# Patient Record
Sex: MALE | Race: WHITE
[De-identification: names, ages, dates, MRNs, and addresses within clinical notes are randomized per-mention and may not be internally consistent; named-entity substitution may affect disease eponyms.]

---

## 2019-01-15 ENCOUNTER — HOSPITAL ENCOUNTER (INPATIENT)
Dept: HOSPITAL 54 - ER | Age: 54
LOS: 1 days | Discharge: HOME | DRG: 190 | End: 2019-01-16
Attending: INTERNAL MEDICINE | Admitting: NURSE PRACTITIONER
Payer: COMMERCIAL

## 2019-01-15 VITALS — BODY MASS INDEX: 26.66 KG/M2 | WEIGHT: 180 LBS | HEIGHT: 69 IN

## 2019-01-15 DIAGNOSIS — F17.210: ICD-10-CM

## 2019-01-15 DIAGNOSIS — I21.4: Primary | ICD-10-CM

## 2019-01-15 DIAGNOSIS — E11.9: ICD-10-CM

## 2019-01-15 DIAGNOSIS — K76.0: ICD-10-CM

## 2019-01-15 DIAGNOSIS — N28.1: ICD-10-CM

## 2019-01-15 DIAGNOSIS — Z79.84: ICD-10-CM

## 2019-01-15 DIAGNOSIS — Z71.6: ICD-10-CM

## 2019-01-15 DIAGNOSIS — N17.0: ICD-10-CM

## 2019-01-15 DIAGNOSIS — I10: ICD-10-CM

## 2019-01-15 DIAGNOSIS — K40.20: ICD-10-CM

## 2019-01-15 DIAGNOSIS — Z87.442: ICD-10-CM

## 2019-01-15 LAB
ALBUMIN SERPL BCP-MCNC: 3.9 G/DL (ref 3.4–5)
ALP SERPL-CCNC: 48 U/L (ref 46–116)
ALT SERPL W P-5'-P-CCNC: 41 U/L (ref 12–78)
AST SERPL W P-5'-P-CCNC: 14 U/L (ref 15–37)
BASOPHILS # BLD AUTO: 0 /CMM (ref 0–0.2)
BASOPHILS NFR BLD AUTO: 0.4 % (ref 0–2)
BILIRUB DIRECT SERPL-MCNC: 0 MG/DL (ref 0–0.2)
BILIRUB SERPL-MCNC: 0.2 MG/DL (ref 0.2–1)
BUN SERPL-MCNC: 21 MG/DL (ref 7–18)
CALCIUM SERPL-MCNC: 9.1 MG/DL (ref 8.5–10.1)
CHLORIDE SERPL-SCNC: 105 MMOL/L (ref 98–107)
CO2 SERPL-SCNC: 29 MMOL/L (ref 21–32)
CREAT SERPL-MCNC: 0.7 MG/DL (ref 0.6–1.3)
EOSINOPHIL NFR BLD AUTO: 1 % (ref 0–6)
GLUCOSE SERPL-MCNC: 146 MG/DL (ref 74–106)
HCT VFR BLD AUTO: 42 % (ref 39–51)
HGB BLD-MCNC: 13.7 G/DL (ref 13.5–17.5)
LIPASE SERPL-CCNC: 334 U/L (ref 73–393)
LYMPHOCYTES NFR BLD AUTO: 37.3 % (ref 20–44)
LYMPHOCYTES NFR BLD AUTO: 4 /CMM (ref 0.8–4.8)
MCHC RBC AUTO-ENTMCNC: 33 G/DL (ref 31–36)
MCV RBC AUTO: 91 FL (ref 80–96)
MONOCYTES NFR BLD AUTO: 0.7 /CMM (ref 0.1–1.3)
MONOCYTES NFR BLD AUTO: 6.6 % (ref 2–12)
NEUTROPHILS # BLD AUTO: 5.9 /CMM (ref 1.8–8.9)
NEUTROPHILS NFR BLD AUTO: 54.7 % (ref 43–81)
PH UR STRIP: 7 [PH] (ref 5–8)
PLATELET # BLD AUTO: 271 /CMM (ref 150–450)
POTASSIUM SERPL-SCNC: 3.6 MMOL/L (ref 3.5–5.1)
PROT SERPL-MCNC: 7.2 G/DL (ref 6.4–8.2)
RBC # BLD AUTO: 4.56 MIL/UL (ref 4.5–6)
SODIUM SERPL-SCNC: 140 MMOL/L (ref 136–145)
UROBILINOGEN UR STRIP-MCNC: 0.2 EU/DL
WBC NRBC COR # BLD AUTO: 10.7 K/UL (ref 4.3–11)

## 2019-01-15 PROCEDURE — G0378 HOSPITAL OBSERVATION PER HR: HCPCS

## 2019-01-15 NOTE — NUR
PT BIBSELF COMPLAINING OF RIGHT UPPER QUADRANT PAIN RADIATING TO LOWER BACK X3 
MONTHS. PT DENIES N/V/D, FEVER, DYSURIA, HEMATURIA. PT AAOX4. RESPIRATIONS EVEN 
AND UNLABORED. SKIN WARM AND INTACT. NO ACUTE DISTRESS NOTED AT THIS TIME. WILL 
CONTINUE TO MONITOR

## 2019-01-15 NOTE — NUR
IV INITIATED RIGHT AC 18G. LABS DRAWN FROM SITE. PHLEBOTOMIST AT BEDSIDE FOR 
BLOOD DRAW. IV INTACT AND PATENT

## 2019-01-15 NOTE — NUR
TELE/RN NOTES



RECEIVED PT. FROM ER VIA [facility]. PT. IS AWAKE, ALERT AND ORIENTED X3. BREATHING EVEN AND 
UNLABORED ON ROOM AIR. NO SOB, RESPIRATORY DISTRESS OR COMPLAINTS OF PAIN NOTED AT THIS 
TIME. NO COMPLAINTS OF CHEST PAIN NOTED AT THIS TIME. ORIENTED PT. TO ROOM. PLACED EXTERNAL 
CARDIAC MONITOR ON PT. CURRENT RHYTHM = SINUS RHYTHM WITH INVERTED T WAVE HR 60. PT. WITH 
RIGHT AC 18 GAUGE IV SALINE LOCK PRESENT, PATENT AND INTACT. BED LOCKED AND IN LOWEST 
POSITION, SIDE RAILS UP X2, CALL LIGHT WITHIN REACH, WILL CONTINUE TO MONITOR.

## 2019-01-16 VITALS — SYSTOLIC BLOOD PRESSURE: 127 MMHG | DIASTOLIC BLOOD PRESSURE: 82 MMHG

## 2019-01-16 VITALS — DIASTOLIC BLOOD PRESSURE: 64 MMHG | SYSTOLIC BLOOD PRESSURE: 111 MMHG

## 2019-01-16 VITALS — DIASTOLIC BLOOD PRESSURE: 86 MMHG | SYSTOLIC BLOOD PRESSURE: 132 MMHG

## 2019-01-16 VITALS — SYSTOLIC BLOOD PRESSURE: 121 MMHG | DIASTOLIC BLOOD PRESSURE: 79 MMHG

## 2019-01-16 LAB
BASOPHILS # BLD AUTO: 0 /CMM (ref 0–0.2)
BASOPHILS NFR BLD AUTO: 0.5 % (ref 0–2)
BUN SERPL-MCNC: 19 MG/DL (ref 7–18)
CALCIUM SERPL-MCNC: 8.6 MG/DL (ref 8.5–10.1)
CHLORIDE SERPL-SCNC: 106 MMOL/L (ref 98–107)
CHOLEST SERPL-MCNC: 147 MG/DL (ref ?–200)
CO2 SERPL-SCNC: 24 MMOL/L (ref 21–32)
CREAT SERPL-MCNC: 0.7 MG/DL (ref 0.6–1.3)
EOSINOPHIL NFR BLD AUTO: 1.3 % (ref 0–6)
GLUCOSE SERPL-MCNC: 122 MG/DL (ref 74–106)
HCT VFR BLD AUTO: 40 % (ref 39–51)
HDLC SERPL-MCNC: 37 MG/DL (ref 40–60)
HGB BLD-MCNC: 13.3 G/DL (ref 13.5–17.5)
LDLC SERPL DIRECT ASSAY-MCNC: 87 MG/DL (ref 0–99)
LYMPHOCYTES NFR BLD AUTO: 4.6 /CMM (ref 0.8–4.8)
LYMPHOCYTES NFR BLD AUTO: 52 % (ref 20–44)
MAGNESIUM SERPL-MCNC: 1.9 MG/DL (ref 1.8–2.4)
MCHC RBC AUTO-ENTMCNC: 34 G/DL (ref 31–36)
MCV RBC AUTO: 90 FL (ref 80–96)
MONOCYTES NFR BLD AUTO: 0.5 /CMM (ref 0.1–1.3)
MONOCYTES NFR BLD AUTO: 5.8 % (ref 2–12)
NEUTROPHILS # BLD AUTO: 3.6 /CMM (ref 1.8–8.9)
NEUTROPHILS NFR BLD AUTO: 40.4 % (ref 43–81)
PHOSPHATE SERPL-MCNC: 4 MG/DL (ref 2.5–4.9)
PLATELET # BLD AUTO: 233 /CMM (ref 150–450)
POTASSIUM SERPL-SCNC: 3.5 MMOL/L (ref 3.5–5.1)
RBC # BLD AUTO: 4.43 MIL/UL (ref 4.5–6)
SODIUM SERPL-SCNC: 132 MMOL/L (ref 136–145)
TRIGL SERPL-MCNC: 145 MG/DL (ref 30–150)
TSH SERPL DL<=0.005 MIU/L-ACNC: 2.68 UIU/ML (ref 0.36–3.74)
WBC NRBC COR # BLD AUTO: 8.9 K/UL (ref 4.3–11)

## 2019-01-16 NOTE — NUR
TELE RN NOTES



PATIENT FOR DISCHARGE HOME TODAY AS ORDERED BY DR. DE LA GARZA. DISCHARGE INSTRUCTIONS AND 
EDUCATION GIVEN AND PROVIDED TO PATIENT AND VERBALIZED UNDERSTANDING. IV REMOVED WITH 
MINIMAL BLEEDING NOTED, PRESSURE DRESSING PLACED ON SITE. ALL BELONGINGS COMPLETE, NO REPORT 
OF MISSING BELONGINGS. INFORMED PATIENT OF RECOMMENDATION BY DR. DE LA GARZA TO TAKE ASPIRIN 
81MG PO DAILY. PATIENT VERBALIZED THAT HE ALREADY TAKES ASPIRIN 81MG DAILY AT HOME BUT 
FORGOT TO MENTION IT WHEN ASKED FOR HOME MEDICATIONS. PATIENT LEFT UNIT AT 1430 IN STABLE 
CONDITION. BREATHING EVEN AND UNLABORED. AMBULATORY, NO ACUTE DISTRESS. DENIES ANY PAIN OR 
DISCOMFORT. PATIENT LEFT HOSPITAL PREMISES BY PRIVATE CAR, ACCOMPANIED BY SIGNIFICANT OTHER. 
MD MADE AWARE OF DISCHARGE. TELEMETRY/CARDIAC MONITOR RETURNED TO TECH

## 2019-01-16 NOTE — NUR
TELE RN NOTES



PATIENT SEEN AND EXAMINED BY DR. MILLER. WITH NEW ORDER FOR [facility]. VERIFIED INFORMED CONSENT 
OBTAINED BY MD FROM PATIENT AND WITNESSED BY NURSING STAFF. PATIENT NPO SINCE MIDNIGHT. PER 
DR. MILLER, OK TO TAKE PO MEDICATIONS. MEDICATIONS GIVEN AS ORDERED. RADIOLOGY AWARE. WILL 
CONTINUE TO MONITOR

## 2019-01-16 NOTE — NUR
TELE/RN NOTES



PT. IS LYING IN BED RESTING. BREATHING EVEN AND UNLABORED ON ROOM AIR. NO SOB, RESPIRATORY 
DISTRESS OR COMPLAINTS OF PAIN NOTED AT THIS TIME. NO COMPLAINTS OF CHEST PAIN NOTED AT THIS 
TIME AND THROUGHOUT SHIFT. PT. WITH EXTERNAL CARDIAC MONITOR PRESENT AND INTACT. CURRENT 
RHYTHM = SINUS RHYTHM WITH INVERTED T WAVE HR 76. PT. WITH RIGHT AC 18 GAUGE PERIPHERAL IV 
PRESENT, PATENT AND INTACT ADMINISTERING TO PT. NS @ 75 ML/HR. ALL PT. NEEDS MET. BED LOCKED 
AND IN LOWEST POSITION, SIDE RAILS UP X2, CALL LIGHT WITHIN REACH, WILL ENDORSE TO DAYSHIFT 
NURSE FOR CONTINUITY OF CARE.

## 2019-01-16 NOTE — NUR
TELE RN NOTES



PATIENT RECEIVED RESTING INSIDE ROOM. AWAKE, ALERT AND ORIENTED, VERBALLY RESPONSIVE AND 
RESPONDS TO VERBAL AND TACTILE STIMULI. NO ACUTE DISTRESS AT THIS TIME. DENIES ANY PAIN OR 
DISCOMFORT. NO CHANGES IN LOC. CONTINUE ON  TELEMETRY, CARDIAC MONITOR IN PLACE. AWAITING 
CARDIAC CONSULT THIS AM. PATIENT MADE AWARE THAT CARDIOLOGIST MAY ORDER PROCEDURES THAT 
REQUIRE PATIENT TO BE ON AN EMPTY STOMACH. PATIENT BEEN NPO SINCE LAST NIGHT AS HE WAS 
SLEEPING. PATIENT VERBALIZED UNDERSTANDING AND AGREED, STATED HE HAD A STRESS TEST DONE 
ABOUT A YEAR AGO AND IS AWARE OF PROCEDURE. CNA MADE AWARE. WILL CONTINUE TO MONITOR. BED 
LOCKED AND IN LOW POSITION. BILATERAL UPPER SIDE RAILS UP AND LOCKED. CALL LIGHT WITHIN EASY 
REACH